# Patient Record
Sex: MALE | Race: WHITE | NOT HISPANIC OR LATINO | Employment: FULL TIME | URBAN - METROPOLITAN AREA
[De-identification: names, ages, dates, MRNs, and addresses within clinical notes are randomized per-mention and may not be internally consistent; named-entity substitution may affect disease eponyms.]

---

## 2023-03-04 LAB
ALBUMIN (G/DL) IN SER/PLAS: 4.6 G/DL (ref 3.4–5)
ANION GAP IN SER/PLAS: 11 MMOL/L (ref 10–20)
APPEARANCE, URINE: CLEAR
BILIRUBIN, URINE: NEGATIVE
BLOOD, URINE: ABNORMAL
CALCIUM (MG/DL) IN SER/PLAS: 9.7 MG/DL (ref 8.6–10.3)
CARBON DIOXIDE, TOTAL (MMOL/L) IN SER/PLAS: 27 MMOL/L (ref 21–32)
CHLORIDE (MMOL/L) IN SER/PLAS: 105 MMOL/L (ref 98–107)
COLOR, URINE: YELLOW
CORTISOL (UG/DL) IN SERUM: 0.5 UG/DL (ref 2.5–20)
CREATININE (MG/DL) IN SER/PLAS: 1.01 MG/DL (ref 0.5–1.3)
DEHYDROEPIANDROSTERONE SULFATE (DHEA-S) (UG/DL) IN SER/: <5 UG/DL (ref 95–530)
FOLLITROPIN (IU/L) IN SER/PLAS: 1.8 IU/L
GFR MALE: >90 ML/MIN/1.73M2
GLUCOSE (MG/DL) IN SER/PLAS: 94 MG/DL (ref 74–99)
GLUCOSE, URINE: NEGATIVE MG/DL
KETONES, URINE: NEGATIVE MG/DL
LEUKOCYTE ESTERASE, URINE: NEGATIVE
LUTEINIZING HORMONE (IU/ML) IN SER/PLAS: 0.2 IU/L
MUCUS, URINE: NORMAL /LPF
NITRITE, URINE: NEGATIVE
OSMOLALITY, RANDOM URINE: 646 MOSM/KG (ref 200–1200)
PH, URINE: 5 (ref 5–8)
PHOSPHATE (MG/DL) IN SER/PLAS: 4.5 MG/DL (ref 2.5–4.9)
POTASSIUM (MMOL/L) IN SER/PLAS: 4.1 MMOL/L (ref 3.5–5.3)
PROLACTIN (UG/L) IN SER/PLAS: 7.8 UG/L (ref 2–18)
PROTEIN, URINE: NEGATIVE MG/DL
RBC, URINE: <1 /HPF (ref 0–5)
SODIUM (MMOL/L) IN SER/PLAS: 139 MMOL/L (ref 136–145)
SPECIFIC GRAVITY, URINE: 1.01 (ref 1–1.03)
THYROTROPIN (MIU/L) IN SER/PLAS BY DETECTION LIMIT <= 0.05 MIU/L: 2.79 MIU/L (ref 0.44–3.98)
THYROXINE (T4) FREE (NG/DL) IN SER/PLAS: 0.31 NG/DL (ref 0.61–1.12)
UREA NITROGEN (MG/DL) IN SER/PLAS: 16 MG/DL (ref 6–23)
UROBILINOGEN, URINE: <2 MG/DL (ref 0–1.9)
WBC, URINE: <1 /HPF (ref 0–5)

## 2023-03-07 LAB — ADRENOCORTICOTROPIC HORMONE: 16.3 PG/ML (ref 7.2–63.3)

## 2023-03-08 LAB
GROWTH HORMONE: <0.05 NG/ML (ref 0.05–3)
IGF 1 (INSULIN-LIKE GROWTH FACTOR 1): 58 NG/ML (ref 82–237)
IGF 1 Z SCORE CALCULATION: -3.7

## 2023-03-16 LAB
TESTOSTERONE FREE (CHAN): 3 PG/ML (ref 35–155)
TESTOSTERONE,TOTAL,LC-MS/MS: 8 NG/DL (ref 250–1100)

## 2023-03-24 LAB
ERYTHROCYTE DISTRIBUTION WIDTH (RATIO) BY AUTOMATED COUNT: 12.2 % (ref 11.5–14.5)
ERYTHROCYTE MEAN CORPUSCULAR HEMOGLOBIN CONCENTRATION (G/DL) BY AUTOMATED: 33.7 G/DL (ref 32–36)
ERYTHROCYTE MEAN CORPUSCULAR VOLUME (FL) BY AUTOMATED COUNT: 91 FL (ref 80–100)
ERYTHROCYTES (10*6/UL) IN BLOOD BY AUTOMATED COUNT: 3.96 X10E12/L (ref 4.5–5.9)
HEMATOCRIT (%) IN BLOOD BY AUTOMATED COUNT: 36.2 % (ref 41–52)
HEMOGLOBIN (G/DL) IN BLOOD: 12.2 G/DL (ref 13.5–17.5)
LEUKOCYTES (10*3/UL) IN BLOOD BY AUTOMATED COUNT: 5.6 X10E9/L (ref 4.4–11.3)
NRBC (PER 100 WBCS) BY AUTOMATED COUNT: 0 /100 WBC (ref 0–0)
PLATELETS (10*3/UL) IN BLOOD AUTOMATED COUNT: 251 X10E9/L (ref 150–450)

## 2023-03-25 LAB
ALANINE AMINOTRANSFERASE (SGPT) (U/L) IN SER/PLAS: 20 U/L (ref 10–52)
ALBUMIN (G/DL) IN SER/PLAS: 4.7 G/DL (ref 3.4–5)
ALKALINE PHOSPHATASE (U/L) IN SER/PLAS: 57 U/L (ref 33–120)
ANION GAP IN SER/PLAS: 13 MMOL/L (ref 10–20)
ASPARTATE AMINOTRANSFERASE (SGOT) (U/L) IN SER/PLAS: 20 U/L (ref 9–39)
BILIRUBIN TOTAL (MG/DL) IN SER/PLAS: 0.6 MG/DL (ref 0–1.2)
CALCIUM (MG/DL) IN SER/PLAS: 10.3 MG/DL (ref 8.6–10.6)
CARBON DIOXIDE, TOTAL (MMOL/L) IN SER/PLAS: 31 MMOL/L (ref 21–32)
CHLORIDE (MMOL/L) IN SER/PLAS: 104 MMOL/L (ref 98–107)
CREATININE (MG/DL) IN SER/PLAS: 1.01 MG/DL (ref 0.5–1.3)
GFR MALE: >90 ML/MIN/1.73M2
GLUCOSE (MG/DL) IN SER/PLAS: 83 MG/DL (ref 74–99)
POTASSIUM (MMOL/L) IN SER/PLAS: 4.4 MMOL/L (ref 3.5–5.3)
PROSTATE SPECIFIC ANTIGEN,SCREEN: <0.1 NG/ML (ref 0–4)
PROTEIN TOTAL: 7.1 G/DL (ref 6.4–8.2)
SODIUM (MMOL/L) IN SER/PLAS: 144 MMOL/L (ref 136–145)
THYROXINE (T4) FREE (NG/DL) IN SER/PLAS: 1.05 NG/DL (ref 0.78–1.48)
UREA NITROGEN (MG/DL) IN SER/PLAS: 19 MG/DL (ref 6–23)

## 2023-08-26 LAB
ALANINE AMINOTRANSFERASE (SGPT) (U/L) IN SER/PLAS: 19 U/L (ref 10–52)
ALBUMIN (G/DL) IN SER/PLAS: 4.7 G/DL (ref 3.4–5)
ALKALINE PHOSPHATASE (U/L) IN SER/PLAS: 51 U/L (ref 33–120)
ANION GAP IN SER/PLAS: 11 MMOL/L (ref 10–20)
ASPARTATE AMINOTRANSFERASE (SGOT) (U/L) IN SER/PLAS: 21 U/L (ref 9–39)
BILIRUBIN TOTAL (MG/DL) IN SER/PLAS: 0.6 MG/DL (ref 0–1.2)
CALCIUM (MG/DL) IN SER/PLAS: 9.2 MG/DL (ref 8.6–10.3)
CARBON DIOXIDE, TOTAL (MMOL/L) IN SER/PLAS: 28 MMOL/L (ref 21–32)
CHLORIDE (MMOL/L) IN SER/PLAS: 105 MMOL/L (ref 98–107)
CREATININE (MG/DL) IN SER/PLAS: 1.02 MG/DL (ref 0.5–1.3)
ERYTHROCYTE DISTRIBUTION WIDTH (RATIO) BY AUTOMATED COUNT: 13 % (ref 11.5–14.5)
ERYTHROCYTE MEAN CORPUSCULAR HEMOGLOBIN CONCENTRATION (G/DL) BY AUTOMATED: 33.5 G/DL (ref 32–36)
ERYTHROCYTE MEAN CORPUSCULAR VOLUME (FL) BY AUTOMATED COUNT: 89 FL (ref 80–100)
ERYTHROCYTES (10*6/UL) IN BLOOD BY AUTOMATED COUNT: 4.72 X10E12/L (ref 4.5–5.9)
GFR MALE: >90 ML/MIN/1.73M2
GLUCOSE (MG/DL) IN SER/PLAS: 94 MG/DL (ref 74–99)
HEMATOCRIT (%) IN BLOOD BY AUTOMATED COUNT: 42.1 % (ref 41–52)
HEMOGLOBIN (G/DL) IN BLOOD: 14.1 G/DL (ref 13.5–17.5)
LEUKOCYTES (10*3/UL) IN BLOOD BY AUTOMATED COUNT: 6.4 X10E9/L (ref 4.4–11.3)
PLATELETS (10*3/UL) IN BLOOD AUTOMATED COUNT: 240 X10E9/L (ref 150–450)
POTASSIUM (MMOL/L) IN SER/PLAS: 4.4 MMOL/L (ref 3.5–5.3)
PROLACTIN (UG/L) IN SER/PLAS: 8 UG/L (ref 2–18)
PROSTATE SPECIFIC ANTIGEN,SCREEN: 0.19 NG/ML (ref 0–4)
PROTEIN TOTAL: 6.7 G/DL (ref 6.4–8.2)
SODIUM (MMOL/L) IN SER/PLAS: 140 MMOL/L (ref 136–145)
THYROXINE (T4) FREE (NG/DL) IN SER/PLAS: 0.62 NG/DL (ref 0.61–1.12)
UREA NITROGEN (MG/DL) IN SER/PLAS: 17 MG/DL (ref 6–23)

## 2023-08-31 LAB
TESTOSTERONE FREE (CHAN): 79.4 PG/ML (ref 35–155)
TESTOSTERONE,TOTAL,LC-MS/MS: 446 NG/DL (ref 250–1100)

## 2023-11-04 DIAGNOSIS — E23.0 HYPOPITUITARISM (MULTI): ICD-10-CM

## 2023-11-04 DIAGNOSIS — E29.1 HYPOGONADISM MALE: Primary | ICD-10-CM

## 2023-11-06 RX ORDER — TESTOSTERONE CYPIONATE 200 MG/ML
INJECTION, SOLUTION INTRAMUSCULAR
Qty: 1 ML | Refills: 3 | Status: SHIPPED | OUTPATIENT
Start: 2023-11-06 | End: 2024-03-25 | Stop reason: SDUPTHER

## 2023-12-08 DIAGNOSIS — E23.6 RATHKE'S CLEFT CYST (MULTI): Primary | ICD-10-CM

## 2024-02-12 ENCOUNTER — HOSPITAL ENCOUNTER (OUTPATIENT)
Dept: RADIOLOGY | Facility: HOSPITAL | Age: 39
Discharge: HOME | End: 2024-02-12
Payer: COMMERCIAL

## 2024-02-12 DIAGNOSIS — E23.6 RATHKE'S CLEFT CYST (MULTI): ICD-10-CM

## 2024-02-12 PROCEDURE — 70553 MRI BRAIN STEM W/O & W/DYE: CPT

## 2024-02-12 PROCEDURE — A9575 INJ GADOTERATE MEGLUMI 0.1ML: HCPCS | Performed by: NURSE PRACTITIONER

## 2024-02-12 PROCEDURE — 70553 MRI BRAIN STEM W/O & W/DYE: CPT | Performed by: RADIOLOGY

## 2024-02-12 PROCEDURE — 2550000001 HC RX 255 CONTRASTS: Performed by: NURSE PRACTITIONER

## 2024-02-12 RX ORDER — GADOTERATE MEGLUMINE 376.9 MG/ML
0.2 INJECTION INTRAVENOUS
Status: COMPLETED | OUTPATIENT
Start: 2024-02-12 | End: 2024-02-12

## 2024-02-12 RX ADMIN — GADOTERATE MEGLUMINE 20 ML: 376.9 INJECTION INTRAVENOUS at 10:29

## 2024-02-23 ENCOUNTER — APPOINTMENT (OUTPATIENT)
Dept: ENDOCRINOLOGY | Facility: CLINIC | Age: 39
End: 2024-02-23
Payer: COMMERCIAL

## 2024-02-28 ENCOUNTER — TELEPHONE (OUTPATIENT)
Dept: ENDOCRINOLOGY | Facility: HOSPITAL | Age: 39
End: 2024-02-28
Payer: COMMERCIAL

## 2024-02-28 DIAGNOSIS — D49.7 HYPOPITUITARISM DUE TO PITUITARY TUMOR (MULTI): Primary | ICD-10-CM

## 2024-02-28 DIAGNOSIS — E23.0 HYPOPITUITARISM DUE TO PITUITARY TUMOR (MULTI): Primary | ICD-10-CM

## 2024-02-28 NOTE — TELEPHONE ENCOUNTER
----- Message from Maru Van MD sent at 2/28/2024 10:50 AM EST -----  Regarding: RE: REfill Testosterone  He just last min cancelled his follow-up appt last week. Due for labs and needs to reschedule to be seen sooner than next Aug...  I will place orders, please let him know.  Pam - can please add him as a virtual appt on Mon 3/25 , anytime after 9a? TY  ----- Message -----  From: Sydney Chisholm RN  Sent: 2/27/2024   5:51 PM EST  To: Maru Van MD  Subject: REfill Testosterone                              Hi Maru  Patient needs refill on Testosterone, please send to local pharmacy.  Thanks  Sydney

## 2024-03-09 ENCOUNTER — LAB (OUTPATIENT)
Dept: LAB | Facility: LAB | Age: 39
End: 2024-03-09
Payer: COMMERCIAL

## 2024-03-09 DIAGNOSIS — E23.0 HYPOPITUITARISM DUE TO PITUITARY TUMOR (MULTI): ICD-10-CM

## 2024-03-09 DIAGNOSIS — D49.7 HYPOPITUITARISM DUE TO PITUITARY TUMOR (MULTI): ICD-10-CM

## 2024-03-09 LAB
ALBUMIN SERPL BCP-MCNC: 4.3 G/DL (ref 3.4–5)
ALP SERPL-CCNC: 46 U/L (ref 33–120)
ALT SERPL W P-5'-P-CCNC: 19 U/L (ref 10–52)
ANION GAP SERPL CALC-SCNC: 15 MMOL/L (ref 10–20)
AST SERPL W P-5'-P-CCNC: 19 U/L (ref 9–39)
BILIRUB SERPL-MCNC: 0.8 MG/DL (ref 0–1.2)
BUN SERPL-MCNC: 14 MG/DL (ref 6–23)
CALCIUM SERPL-MCNC: 8.9 MG/DL (ref 8.6–10.3)
CHLORIDE SERPL-SCNC: 106 MMOL/L (ref 98–107)
CO2 SERPL-SCNC: 24 MMOL/L (ref 21–32)
CREAT SERPL-MCNC: 0.81 MG/DL (ref 0.5–1.3)
EGFRCR SERPLBLD CKD-EPI 2021: >90 ML/MIN/1.73M*2
ERYTHROCYTE [DISTWIDTH] IN BLOOD BY AUTOMATED COUNT: 12.8 % (ref 11.5–14.5)
GLUCOSE SERPL-MCNC: 97 MG/DL (ref 74–99)
HCT VFR BLD AUTO: 42.2 % (ref 41–52)
HGB BLD-MCNC: 14.6 G/DL (ref 13.5–17.5)
MCH RBC QN AUTO: 30.1 PG (ref 26–34)
MCHC RBC AUTO-ENTMCNC: 34.6 G/DL (ref 32–36)
MCV RBC AUTO: 87 FL (ref 80–100)
NRBC BLD-RTO: 0 /100 WBCS (ref 0–0)
PLATELET # BLD AUTO: 234 X10*3/UL (ref 150–450)
POTASSIUM SERPL-SCNC: 4 MMOL/L (ref 3.5–5.3)
PROLACTIN SERPL-MCNC: 9.6 UG/L (ref 2–18)
PROT SERPL-MCNC: 6.4 G/DL (ref 6.4–8.2)
PSA SERPL-MCNC: 0.66 NG/ML
RBC # BLD AUTO: 4.85 X10*6/UL (ref 4.5–5.9)
SODIUM SERPL-SCNC: 141 MMOL/L (ref 136–145)
T4 FREE SERPL-MCNC: 0.82 NG/DL (ref 0.61–1.12)
WBC # BLD AUTO: 4 X10*3/UL (ref 4.4–11.3)

## 2024-03-09 PROCEDURE — 84146 ASSAY OF PROLACTIN: CPT

## 2024-03-09 PROCEDURE — 36415 COLL VENOUS BLD VENIPUNCTURE: CPT

## 2024-03-09 PROCEDURE — 84402 ASSAY OF FREE TESTOSTERONE: CPT

## 2024-03-09 PROCEDURE — 84153 ASSAY OF PSA TOTAL: CPT

## 2024-03-15 LAB
TESTOSTERONE FREE (CHAN): 77.3 PG/ML (ref 35–155)
TESTOSTERONE,TOTAL,LC-MS/MS: 452 NG/DL (ref 250–1100)

## 2024-03-17 DIAGNOSIS — E23.0 HYPOPITUITARISM DUE TO PITUITARY TUMOR (MULTI): Primary | ICD-10-CM

## 2024-03-17 DIAGNOSIS — D49.7 HYPOPITUITARISM DUE TO PITUITARY TUMOR (MULTI): Primary | ICD-10-CM

## 2024-03-18 RX ORDER — LEVOTHYROXINE SODIUM 137 UG/1
TABLET ORAL
Qty: 30 TABLET | Refills: 0 | Status: SHIPPED | OUTPATIENT
Start: 2024-03-18 | End: 2024-03-25 | Stop reason: DRUGHIGH

## 2024-03-25 ENCOUNTER — TELEMEDICINE (OUTPATIENT)
Dept: ENDOCRINOLOGY | Facility: CLINIC | Age: 39
End: 2024-03-25
Payer: COMMERCIAL

## 2024-03-25 DIAGNOSIS — E29.1 HYPOGONADISM MALE: ICD-10-CM

## 2024-03-25 DIAGNOSIS — E23.0 HYPOPITUITARISM DUE TO PITUITARY TUMOR (MULTI): Primary | ICD-10-CM

## 2024-03-25 DIAGNOSIS — D49.7 HYPOPITUITARISM DUE TO PITUITARY TUMOR (MULTI): Primary | ICD-10-CM

## 2024-03-25 DIAGNOSIS — E27.49 SECONDARY ADRENAL INSUFFICIENCY (MULTI): ICD-10-CM

## 2024-03-25 DIAGNOSIS — E23.7: ICD-10-CM

## 2024-03-25 DIAGNOSIS — E23.0 HYPOGONADOTROPIC HYPOGONADISM IN MALE (MULTI): ICD-10-CM

## 2024-03-25 DIAGNOSIS — E03.8: ICD-10-CM

## 2024-03-25 DIAGNOSIS — E23.0 HYPOPITUITARISM (MULTI): ICD-10-CM

## 2024-03-25 PROCEDURE — 99214 OFFICE O/P EST MOD 30 MIN: CPT | Performed by: INTERNAL MEDICINE

## 2024-03-25 RX ORDER — TESTOSTERONE CYPIONATE 200 MG/ML
INJECTION, SOLUTION INTRAMUSCULAR
Qty: 1 ML | Refills: 3 | Status: SHIPPED | OUTPATIENT
Start: 2024-03-25

## 2024-03-25 RX ORDER — LEVOTHYROXINE SODIUM 150 UG/1
150 TABLET ORAL
Qty: 90 TABLET | Refills: 3 | Status: SHIPPED | OUTPATIENT
Start: 2024-03-25 | End: 2025-03-25

## 2024-03-25 RX ORDER — HYDROCORTISONE 5 MG/1
TABLET ORAL
COMMUNITY
End: 2024-04-01

## 2024-03-25 NOTE — PROGRESS NOTES
An interactive audio and video telecommunication system which permits real time communications between the patient (at the originating site) and provider (at the distant site) was utilized to provide this telehealth service.   Verbal consent was requested and obtained from Adolph Delgado on this date, 03/25/24 for a telehealth visit.     Subjective   Adolph Delgado is a 39 y.o. male who presents to endocrinology clinic for follow-up of hypopituitarism in the setting of an incidentally found pituitary mass   Last visit: 8/18/23    ENDOCRINE Hx:   He went to the ED 12/5/22 for altered mentation and ultimately diagnosed with influenza / viral meningitis. As part of his evaluation, he had an MRI brain which showed a mass in the sellar region. MRI sella w/wo contrast from 12/7/22: 1.6x1.6x2cm (my measurements) non-enhancing slightly heterogenous sellar mass abutting optic chiasm and the cavernous sinuses bilaterally. Labs also sent on 12/7/22 (during hospitalization): a low FT4 with inappropriately normal TSH, undetectable ACTH, and low free cortisol. In late Feb 2023, saw neuro-ophtho - no significant visual field defects.    Seen for initial endocrine eval in early March. 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 He was started on LT4 and HC replacement, followed by T replacement. Referred to sleep med for c/f GENTRY; saw in May 2023, recommended PSG.    MRI sella repeated in June 2023: stable to slight decrease in size.  States NSGY cancelled his follow-up appointment.    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    Objective   Reports weight is up to 230 lb    Physical Exam  alert and conversant, in no acute distress  no lid lag, no proptosis  no visible goiter  normal work of breathing  some facial hair  No visible rashes  Normal mood /affect     Lab Results   Component Value Date    FREET4 0.82 03/09/2024    PROLACTIN 9.6 03/09/2024    ALT 19 03/09/2024    AST 19 03/09/2024    TESTOTOTMS 452 03/09/2024    PSASCREEN 0.66 03/09/2024    HGB 14.6 03/09/2024    HCT 42.2 03/09/2024     MRI Sella 2/12/24:   There is again evidence of a sellar/suprasellar nonenhancing lesion demonstrating bright signal on the T2 images and intermediate signal on the T1 weighted images without significant interval change in size and configuration when compared with the prior MRI dated 06/02/2023 as described above. Again, possible considerations could include a cystic pituitary macroadenoma versus Rathke's cleft cyst.    Assessment/Plan   39 y.o. 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  Clinically well replaced with  HC and T   Some weight gain - FT4 on lower side - will uptitrate LT4 dose     PLAN  - Continue HC 10-5-5mg  - continue T 100mg q2 weeks; due to complete T contract, reviewed & will send via [employer]  - increase LT4 to 150mcg daily (or 137mcg 8 tabs/wk)  - overdue for follow-up with ophtho (scheduled in July), neurosurgery (will message)  - recommended scheduling his PSG  - RTC 6 mo with labs prior:  -     Insulin-Like Growth Factor 1; Future  -     Prolactin; Future  -     Comprehensive Metabolic Panel; Future  -     CBC; Future  -     Testosterone,Total, LC-MS/MS; Future  -     Thyroxine, Free; Future

## 2024-03-25 NOTE — PATIENT INSTRUCTIONS
Labs will be due again in 6 months  Please follow-up with neurosurgery and ophthalmology  Will send you a copy of testosterone contract to complete  Follow-up in 6 months

## 2024-04-01 DIAGNOSIS — E27.49 SECONDARY ADRENAL INSUFFICIENCY (MULTI): Primary | ICD-10-CM

## 2024-04-01 RX ORDER — HYDROCORTISONE 5 MG/1
TABLET ORAL
Qty: 360 TABLET | Refills: 1 | Status: SHIPPED | OUTPATIENT
Start: 2024-04-01

## 2024-04-18 ENCOUNTER — TELEMEDICINE (OUTPATIENT)
Dept: NEUROSURGERY | Facility: CLINIC | Age: 39
End: 2024-04-18
Payer: COMMERCIAL

## 2024-04-18 DIAGNOSIS — E23.6 RATHKE'S CLEFT CYST (MULTI): Primary | ICD-10-CM

## 2024-04-18 PROCEDURE — 99212 OFFICE O/P EST SF 10 MIN: CPT | Mod: 95 | Performed by: NURSE PRACTITIONER

## 2024-04-18 PROCEDURE — 99212 OFFICE O/P EST SF 10 MIN: CPT | Performed by: NURSE PRACTITIONER

## 2024-04-18 NOTE — PROGRESS NOTES
[facility]  Neurosurgery    History of Present Illness                        Objective      Vitals:   There were no vitals taken for this visit.        Physical Exam:            Relevant Results:            Assessment & Plan      Diagnosis:  {There are no diagnoses linked to this encounter. (Refresh or delete this SmartLink)}        Provider Impression:         Medical History     No past medical history on file.  Past Surgical History:   Procedure Laterality Date    OTHER SURGICAL HISTORY  12/13/2022    Anterior cruciate ligament repair    OTHER SURGICAL HISTORY  12/13/2022    Hernia repair    OTHER SURGICAL HISTORY  12/13/2022    Finger amputation        No family history on file.  No Known Allergies  Current Outpatient Medications   Medication Instructions    hydrocortisone (Cortef) 5 mg tablet TAKE BY MOUTH 2 TABS (10MG) IN EARLY MORNING, 1 TAB (5MG) AT MIDDAY, AND 1 TAB (5MG) IN THE LATE AFTERNOON EVERY DAY. TAKE TRIPLE (3X) DOSE IN CASE OF FEVER OR SEVERE ILLNESS.    levothyroxine (SYNTHROID, LEVOXYL) 150 mcg, oral, Daily before breakfast    testosterone cypionate (Depo-Testosterone) 200 mg/mL injection INJECT 100MG (0.5ML) EVERY 2 WEEKS INTRAMUSCULARLY       Denies any vision changes and is following with ophthalmology without any visual felid deficits. Denies any headaches, dizziness, gait instability or recent falls. He is following with endocrinology and taking levothyroxine, hydrocortisone, and testosterone.

## 2024-07-05 ENCOUNTER — APPOINTMENT (OUTPATIENT)
Dept: OPHTHALMOLOGY | Facility: CLINIC | Age: 39
End: 2024-07-05
Payer: COMMERCIAL

## 2024-08-02 ENCOUNTER — APPOINTMENT (OUTPATIENT)
Dept: ENDOCRINOLOGY | Facility: CLINIC | Age: 39
End: 2024-08-02
Payer: COMMERCIAL

## 2024-08-02 DIAGNOSIS — E23.7: ICD-10-CM

## 2024-08-02 DIAGNOSIS — E27.49 SECONDARY ADRENAL INSUFFICIENCY (MULTI): ICD-10-CM

## 2024-08-02 DIAGNOSIS — E23.0 HYPOPITUITARISM (MULTI): Primary | ICD-10-CM

## 2024-08-02 DIAGNOSIS — E23.0 HYPOGONADOTROPIC HYPOGONADISM IN MALE (MULTI): ICD-10-CM

## 2024-08-02 DIAGNOSIS — E03.8: ICD-10-CM

## 2024-08-02 PROCEDURE — 99214 OFFICE O/P EST MOD 30 MIN: CPT | Performed by: INTERNAL MEDICINE

## 2024-08-02 NOTE — PROGRESS NOTES
Subjective   Adolph Delgado is a 39 y.o. male who presents to endocrinology clinic for follow-up   Last visit: 3/25/24 (virtual); seen 8/18/23 in person    ENDOCRINE Hx:   Went to the ED 12/5/22 for altered mentation and ultimately diagnosed with influenza / viral meningitis. As part of his evaluation, he had an MRI brain which showed a mass in the sellar region. MRI sella w/wo contrast from 12/7/22: 1.6x1.6x2cm (my measurements) non-enhancing slightly heterogenous sellar mass abutting optic chiasm and the cavernous sinuses bilaterally. Labs sent on 12/7/22 (during hospitalization): low FT4 with inappropriately normal TSH, undetectable ACTH, and low free cortisol. In late Feb 2023, saw neuro-ophtho - no significant visual field defects.      Seen for initial endocrine eval in early March. 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 He was started on LT4 and HC replacement, followed by T replacement. Referred to sleep med for c/f GENTRY; saw in May 2023, recommended PSG.     MRI sella repeated in June 2023: stable to slight decrease in size  MRI sella repeated Feb 2024: stable     INTERVAL Hx:   Had FUV with NSGY virtually in April - felt likely Rathke's cyst kaela given stability  Hasn't had labs drawn yet    Was out of meds for a week (checked a bag with all his meds in it during the IT outage!)  Felt terrible during that time - nauseated, exhausted  Back on meds now, feeling a LOT better    Otherwise, no interval illnesses  Still hasn't gotten PSG - too busy  Last saw ophtho Feb 2023 - scheduled to see them 9/30    CURRENT REGIMEN:  Testosterone 100mg IM q2 weeks on Sundays - now administering himself, no issues  Levothyroxine 150 mcg - taking daily in the AM  Hydrocortisone - taking 2 tabs in morning, 1 tab midday, 1 tab around 5pm    adherence: good     SOCIAL HISTORY:  Lives with  wife and kids 3 school aged kids  Works in railroad construction, travels a lot  Will be home next week for 2 weeks      ROS:   Energy: good  Headaches: no  Vision: normal   Appetite: good  Sleep: good  N/V: rare  Body/facial hair: more  Hot flashes/ night sweats: no  Bowel habits: regular  LUTS: no  Galactorrhea: no  Urination: normal  Thirst: normal  Otherwise negative.    Objective     Physical Exam  alert and conversant, in no acute distress  no lid lag, no proptosis  no visible goiter  normal work of breathing  + facial hair  No visible rashes  Normal mood /affect     Lab Results   Component Value Date    TSH 2.79 03/04/2023    FREET4 0.82 03/09/2024    PROLACTIN 9.6 03/09/2024    [id number] 58 (L) 03/04/2023    LDLF 64 12/07/2022    TRIG 336 (H) 12/07/2022    ALT 19 03/09/2024    AST 19 03/09/2024    TESTOTOTMS 452 03/09/2024         Assessment/Plan   39 y.o. 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  Clinically well replaced with HC and T & LT4     PLAN  - Continue HC 10-5-5mg  - continue T 100mg q2 weeks  - increase LT4 to 150mcg daily  - will follow-up with ophtho next month  - recommended scheduling his PSG  - due for repeat labs, not yet drawn:  -     Insulin-Like Growth Factor 1; Future  -     Prolactin; Future  -     Comprehensive Metabolic Panel; Future  -     CBC; Future  -     Testosterone,Total, LC-MS/MS; Future  -     Thyroxine, Free; Future  FUV in 6 mo (in person)

## 2024-08-12 ENCOUNTER — TELEPHONE (OUTPATIENT)
Dept: PRIMARY CARE | Facility: CLINIC | Age: 39
End: 2024-08-12
Payer: COMMERCIAL

## 2024-08-12 ENCOUNTER — OFFICE VISIT (OUTPATIENT)
Dept: PRIMARY CARE | Facility: CLINIC | Age: 39
End: 2024-08-12
Payer: COMMERCIAL

## 2024-08-12 ENCOUNTER — LAB (OUTPATIENT)
Dept: LAB | Facility: LAB | Age: 39
End: 2024-08-12
Payer: COMMERCIAL

## 2024-08-12 ENCOUNTER — HOSPITAL ENCOUNTER (OUTPATIENT)
Dept: RADIOLOGY | Facility: HOSPITAL | Age: 39
Discharge: HOME | End: 2024-08-12
Payer: COMMERCIAL

## 2024-08-12 VITALS
BODY MASS INDEX: 31.96 KG/M2 | OXYGEN SATURATION: 96 % | HEART RATE: 74 BPM | SYSTOLIC BLOOD PRESSURE: 107 MMHG | TEMPERATURE: 97.2 F | DIASTOLIC BLOOD PRESSURE: 66 MMHG | WEIGHT: 215.8 LBS

## 2024-08-12 DIAGNOSIS — J18.9 PNEUMONIA OF RIGHT MIDDLE LOBE DUE TO INFECTIOUS ORGANISM: Primary | ICD-10-CM

## 2024-08-12 DIAGNOSIS — J40 BRONCHITIS: ICD-10-CM

## 2024-08-12 DIAGNOSIS — R05.1 ACUTE COUGH: ICD-10-CM

## 2024-08-12 DIAGNOSIS — R50.9 FEVER, UNSPECIFIED FEVER CAUSE: ICD-10-CM

## 2024-08-12 DIAGNOSIS — E66.9 CLASS 1 OBESITY WITH SERIOUS COMORBIDITY AND BODY MASS INDEX (BMI) OF 31.0 TO 31.9 IN ADULT, UNSPECIFIED OBESITY TYPE: ICD-10-CM

## 2024-08-12 LAB
ANION GAP SERPL CALC-SCNC: 9 MMOL/L (ref 10–20)
BASOPHILS # BLD AUTO: 0.02 X10*3/UL (ref 0–0.1)
BASOPHILS NFR BLD AUTO: 0.3 %
BUN SERPL-MCNC: 15 MG/DL (ref 6–23)
CALCIUM SERPL-MCNC: 9.1 MG/DL (ref 8.6–10.3)
CHLORIDE SERPL-SCNC: 101 MMOL/L (ref 98–107)
CO2 SERPL-SCNC: 31 MMOL/L (ref 21–32)
CREAT SERPL-MCNC: 1.01 MG/DL (ref 0.5–1.3)
EGFRCR SERPLBLD CKD-EPI 2021: >90 ML/MIN/1.73M*2
EOSINOPHIL # BLD AUTO: 0.21 X10*3/UL (ref 0–0.7)
EOSINOPHIL NFR BLD AUTO: 3.6 %
ERYTHROCYTE [DISTWIDTH] IN BLOOD BY AUTOMATED COUNT: 12.2 % (ref 11.5–14.5)
GLUCOSE SERPL-MCNC: 113 MG/DL (ref 74–99)
HCT VFR BLD AUTO: 41 % (ref 41–52)
HGB BLD-MCNC: 14 G/DL (ref 13.5–17.5)
IMM GRANULOCYTES # BLD AUTO: 0.01 X10*3/UL (ref 0–0.7)
IMM GRANULOCYTES NFR BLD AUTO: 0.2 % (ref 0–0.9)
LYMPHOCYTES # BLD AUTO: 1.15 X10*3/UL (ref 1.2–4.8)
LYMPHOCYTES NFR BLD AUTO: 19.8 %
MCH RBC QN AUTO: 29.9 PG (ref 26–34)
MCHC RBC AUTO-ENTMCNC: 34.1 G/DL (ref 32–36)
MCV RBC AUTO: 88 FL (ref 80–100)
MONOCYTES # BLD AUTO: 0.58 X10*3/UL (ref 0.1–1)
MONOCYTES NFR BLD AUTO: 10 %
NEUTROPHILS # BLD AUTO: 3.84 X10*3/UL (ref 1.2–7.7)
NEUTROPHILS NFR BLD AUTO: 66.1 %
NRBC BLD-RTO: 0 /100 WBCS (ref 0–0)
PLATELET # BLD AUTO: 200 X10*3/UL (ref 150–450)
POTASSIUM SERPL-SCNC: 3.9 MMOL/L (ref 3.5–5.3)
RBC # BLD AUTO: 4.68 X10*6/UL (ref 4.5–5.9)
SODIUM SERPL-SCNC: 137 MMOL/L (ref 136–145)
WBC # BLD AUTO: 5.8 X10*3/UL (ref 4.4–11.3)

## 2024-08-12 PROCEDURE — 99214 OFFICE O/P EST MOD 30 MIN: CPT | Performed by: NURSE PRACTITIONER

## 2024-08-12 PROCEDURE — 85025 COMPLETE CBC W/AUTO DIFF WBC: CPT

## 2024-08-12 PROCEDURE — 80048 BASIC METABOLIC PNL TOTAL CA: CPT

## 2024-08-12 PROCEDURE — 71046 X-RAY EXAM CHEST 2 VIEWS: CPT

## 2024-08-12 PROCEDURE — 71046 X-RAY EXAM CHEST 2 VIEWS: CPT | Performed by: RADIOLOGY

## 2024-08-12 PROCEDURE — 36415 COLL VENOUS BLD VENIPUNCTURE: CPT

## 2024-08-12 RX ORDER — SYRINGE WITH NEEDLE, 1 ML 25GX5/8"
SYRINGE, EMPTY DISPOSABLE MISCELLANEOUS
COMMUNITY
Start: 2024-06-28

## 2024-08-12 RX ORDER — METHYLPREDNISOLONE 4 MG/1
TABLET ORAL
Qty: 1 EACH | Refills: 0 | Status: SHIPPED | OUTPATIENT
Start: 2024-08-12

## 2024-08-12 RX ORDER — DOXYCYCLINE 100 MG/1
100 TABLET ORAL 2 TIMES DAILY
Qty: 20 TABLET | Refills: 0 | Status: SHIPPED | OUTPATIENT
Start: 2024-08-12 | End: 2024-08-22

## 2024-08-12 NOTE — PROGRESS NOTES
Subjective   Patient ID: Adolph Delgado is a 39 y.o. male who presents for Diarrhea, Cough, Fever, Generalized Body Aches (X 3-4 days), and Nausea.    C/o 3-4 days of cough, fever, general body aches, diarrhea, nausea.  GI symptoms improving.  Afebrile.  On exam, wheezing.  Chest x-ray shows right middle lobe infiltrate.  CBC, no leukocytosis.  Otherwise labs unremarkable.  Start doxycycline 100 mg twice a day for 10 days for pneumonia.  Start Medrol Dosepak for acute bronchitis.  Advised to stay well hydrated, rest. Instructed to call the office if symptoms worsen or do not improve. Tylenol 650 mg every 8 hours as needed for pain or fever. OTC Robitussin or Mucinex according to package directions as needed for cough.        The 10-year ASCVD risk score (Lili CARDENAS, et al., 2019) is: 1.6%    Values used to calculate the score:      Age: 39 years      Sex: Male      Is Non- : No      Diabetic: No      Tobacco smoker: No      Systolic Blood Pressure: 107 mmHg      Is BP treated: No      HDL Cholesterol: 24 mg/dL      Total Cholesterol: 155 mg/dL    Lab on 08/12/2024   Component Date Value Ref Range Status    Glucose 08/12/2024 113 (H)  74 - 99 mg/dL Final    Sodium 08/12/2024 137  136 - 145 mmol/L Final    Potassium 08/12/2024 3.9  3.5 - 5.3 mmol/L Final    Chloride 08/12/2024 101  98 - 107 mmol/L Final    Bicarbonate 08/12/2024 31  21 - 32 mmol/L Final    Anion Gap 08/12/2024 9 (L)  10 - 20 mmol/L Final    Urea Nitrogen 08/12/2024 15  6 - 23 mg/dL Final    Creatinine 08/12/2024 1.01  0.50 - 1.30 mg/dL Final    eGFR 08/12/2024 >90  >60 mL/min/1.73m*2 Final    Calculations of estimated GFR are performed using the 2021 CKD-EPI Study Refit equation without the race variable for the IDMS-Traceable creatinine methods.  https://jasn.asnjournals.org/content/early/2021/09/22/ASN.1673667257    Calcium 08/12/2024 9.1  8.6 - 10.3 mg/dL Final    WBC 08/12/2024 5.8  4.4 - 11.3 x10*3/uL Final    nRBC 08/12/2024  0.0  0.0 - 0.0 /100 WBCs Final    RBC 08/12/2024 4.68  4.50 - 5.90 x10*6/uL Final    Hemoglobin 08/12/2024 14.0  13.5 - 17.5 g/dL Final    Hematocrit 08/12/2024 41.0  41.0 - 52.0 % Final    MCV 08/12/2024 88  80 - 100 fL Final    MCH 08/12/2024 29.9  26.0 - 34.0 pg Final    MCHC 08/12/2024 34.1  32.0 - 36.0 g/dL Final    RDW 08/12/2024 12.2  11.5 - 14.5 % Final    Platelets 08/12/2024 200  150 - 450 x10*3/uL Final    Neutrophils % 08/12/2024 66.1  40.0 - 80.0 % Final    Immature Granulocytes %, Automated 08/12/2024 0.2  0.0 - 0.9 % Final    Immature Granulocyte Count (IG) includes promyelocytes, myelocytes and metamyelocytes but does not include bands. Percent differential counts (%) should be interpreted in the context of the absolute cell counts (cells/UL).    Lymphocytes % 08/12/2024 19.8  13.0 - 44.0 % Final    Monocytes % 08/12/2024 10.0  2.0 - 10.0 % Final    Eosinophils % 08/12/2024 3.6  0.0 - 6.0 % Final    Basophils % 08/12/2024 0.3  0.0 - 2.0 % Final    Neutrophils Absolute 08/12/2024 3.84  1.20 - 7.70 x10*3/uL Final    Percent differential counts (%) should be interpreted in the context of the absolute cell counts (cells/uL).    Immature Granulocytes Absolute, Au* 08/12/2024 0.01  0.00 - 0.70 x10*3/uL Final    Lymphocytes Absolute 08/12/2024 1.15 (L)  1.20 - 4.80 x10*3/uL Final    Monocytes Absolute 08/12/2024 0.58  0.10 - 1.00 x10*3/uL Final    Eosinophils Absolute 08/12/2024 0.21  0.00 - 0.70 x10*3/uL Final    Basophils Absolute 08/12/2024 0.02  0.00 - 0.10 x10*3/uL Final       XR chest 2 views  Narrative: Interpreted By:  Pao Allen,   STUDY:  XR CHEST 2 VIEWS;  8/12/2024 1:07 pm      INDICATION:  Signs/Symptoms:cough.      COMPARISON:  None.      ACCESSION NUMBER(S):  [id number]      ORDERING CLINICIAN:  KIRSTY العراقي      FINDINGS:                  CARDIOMEDIASTINAL SILHOUETTE:  Cardiomediastinal silhouette is normal in size and configuration.      LUNGS:  Lungs are remarkable for a right  middle lobe infiltrate.      ABDOMEN:  No remarkable upper abdominal findings.      BONES:  No acute osseous changes.      Impression: Right middle lobe infiltrate. Consider pneumonia. Recommend follow-up  to document resolution.      MACRO:  None      Signed by: Pao Renatorachel 8/12/2024 2:34 PM  Dictation workstation:   [id number]       Review of Systems   Constitutional:  Positive for fatigue and fever. Negative for activity change, chills and unexpected weight change.   HENT:  Negative for congestion, ear pain and sore throat.    Eyes: Negative.    Respiratory:  Positive for cough, chest tightness and wheezing. Negative for shortness of breath.    Cardiovascular:  Negative for chest pain, palpitations and leg swelling.   Gastrointestinal:  Positive for diarrhea and nausea. Negative for abdominal pain, constipation and vomiting.   Endocrine: Negative.    Genitourinary: Negative.    Musculoskeletal: Negative.    Skin: Negative.    Allergic/Immunologic: Negative.    Neurological:  Negative for dizziness, speech difficulty, numbness and headaches.   Hematological: Negative.    Psychiatric/Behavioral: Negative.     All other systems reviewed and are negative.      Objective   Visit Vitals  /66   Pulse 74   Temp 36.2 °C (97.2 °F)   Wt 97.9 kg (215 lb 12.8 oz)   SpO2 96%   BMI 31.96 kg/m²   Smoking Status Never   BSA 2.18 m²      Physical Exam  Vitals and nursing note reviewed.   Constitutional:       General: He is not in acute distress.     Appearance: Normal appearance. He is obese. He is ill-appearing.   HENT:      Head: Normocephalic and atraumatic.      Right Ear: Tympanic membrane, ear canal and external ear normal.      Left Ear: Tympanic membrane, ear canal and external ear normal.      Nose: Nose normal.      Mouth/Throat:      Mouth: Mucous membranes are moist.      Pharynx: Oropharynx is clear.   Eyes:      Extraocular Movements: Extraocular movements intact.      Conjunctiva/sclera: Conjunctivae  normal.      Pupils: Pupils are equal, round, and reactive to light.   Cardiovascular:      Rate and Rhythm: Normal rate and regular rhythm.      Pulses: Normal pulses.      Heart sounds: Normal heart sounds. No murmur heard.  Pulmonary:      Effort: Pulmonary effort is normal. No respiratory distress.      Breath sounds: Wheezing present.      Comments: cough  Abdominal:      General: Bowel sounds are normal. There is no distension.      Palpations: Abdomen is soft.      Tenderness: There is no abdominal tenderness.   Musculoskeletal:         General: No tenderness. Normal range of motion.      Cervical back: Normal range of motion and neck supple.      Right lower leg: No edema.      Left lower leg: No edema.   Skin:     General: Skin is warm and dry.      Capillary Refill: Capillary refill takes less than 2 seconds.   Neurological:      General: No focal deficit present.      Mental Status: He is alert and oriented to person, place, and time.   Psychiatric:         Mood and Affect: Mood normal.         Behavior: Behavior normal.         Thought Content: Thought content normal.         Judgment: Judgment normal.         Assessment/Plan   Adolph was seen today for diarrhea, cough, fever, generalized body aches and nausea.  Diagnoses and all orders for this visit:  Pneumonia of right middle lobe due to infectious organism (Primary)  -     doxycycline (Adoxa) 100 mg tablet; Take 1 tablet (100 mg) by mouth 2 times a day for 10 days. Take with a full glass of water and do not lie down for at least 30 minutes after  Acute cough  -     Basic Metabolic Panel; Future  -     CBC and Auto Differential; Future  -     XR chest 2 views; Future  Fever, unspecified fever cause  -     Basic Metabolic Panel; Future  -     CBC and Auto Differential; Future  -     XR chest 2 views; Future  Bronchitis  -     methylPREDNISolone (Medrol Dospak) 4 mg tablets; Follow schedule on package instructions  Class 1 obesity with serious  comorbidity and body mass index (BMI) of 31.0 to 31.9 in adult, unspecified obesity type     # Pneumonia  -Start doxycycline 100 mg twice a day for 10 days  -Get plenty of rest  -Stay well-hydrated  -Call the office if symptoms worsen or do not improve  # Acute bronchitis  -Start Medrol Dosepak  -Over-the-counter Mucinex or Robitussin according to package directions as needed for cough  -Tylenol 650-1000 mg every 8 hours as needed for pain  -Drink plenty of fluids  -Get plenty of rest  -Call the office if symptoms worsen or do not improve    Follow-up as needed with me until 10/11/2024 and follow up after with new provider  Patient advised I will be leaving [facility], my last day is October 11, 2024. Discussed options for new provider.

## 2024-08-12 NOTE — TELEPHONE ENCOUNTER
Patient not seen in 2 years. Diarrhea, 102 fever, cough, not sleeping, achy everywhere since Saturday. Covid negative yesterday, Asking for appointment. Able to do virtual if necessary.

## 2024-08-15 ASSESSMENT — ENCOUNTER SYMPTOMS
WHEEZING: 1
ENDOCRINE NEGATIVE: 1
NAUSEA: 1
CONSTIPATION: 0
SHORTNESS OF BREATH: 0
CHEST TIGHTNESS: 1
ALLERGIC/IMMUNOLOGIC NEGATIVE: 1
HEADACHES: 0
CHILLS: 0
SORE THROAT: 0
PSYCHIATRIC NEGATIVE: 1
COUGH: 1
MUSCULOSKELETAL NEGATIVE: 1
UNEXPECTED WEIGHT CHANGE: 0
HEMATOLOGIC/LYMPHATIC NEGATIVE: 1
EYES NEGATIVE: 1
SPEECH DIFFICULTY: 0
ABDOMINAL PAIN: 0
DIZZINESS: 0
VOMITING: 0
FATIGUE: 1
ACTIVITY CHANGE: 0
DIARRHEA: 1
FEVER: 1
PALPITATIONS: 0
NUMBNESS: 0

## 2024-08-17 ENCOUNTER — LAB (OUTPATIENT)
Dept: LAB | Facility: LAB | Age: 39
End: 2024-08-17
Payer: COMMERCIAL

## 2024-08-17 DIAGNOSIS — E23.0 HYPOPITUITARISM DUE TO PITUITARY TUMOR (MULTI): ICD-10-CM

## 2024-08-17 DIAGNOSIS — E23.0 HYPOGONADOTROPIC HYPOGONADISM IN MALE (MULTI): ICD-10-CM

## 2024-08-17 DIAGNOSIS — D49.7 HYPOPITUITARISM DUE TO PITUITARY TUMOR (MULTI): ICD-10-CM

## 2024-08-17 DIAGNOSIS — E23.7: ICD-10-CM

## 2024-08-17 DIAGNOSIS — E27.49 SECONDARY ADRENAL INSUFFICIENCY (MULTI): ICD-10-CM

## 2024-08-17 DIAGNOSIS — E03.8: ICD-10-CM

## 2024-08-17 LAB
ALBUMIN SERPL BCP-MCNC: 4.1 G/DL (ref 3.4–5)
ALP SERPL-CCNC: 42 U/L (ref 33–120)
ALT SERPL W P-5'-P-CCNC: 20 U/L (ref 10–52)
ANION GAP SERPL CALC-SCNC: 10 MMOL/L (ref 10–20)
AST SERPL W P-5'-P-CCNC: 17 U/L (ref 9–39)
BILIRUB SERPL-MCNC: 0.6 MG/DL (ref 0–1.2)
BUN SERPL-MCNC: 19 MG/DL (ref 6–23)
CALCIUM SERPL-MCNC: 8.7 MG/DL (ref 8.6–10.3)
CHLORIDE SERPL-SCNC: 108 MMOL/L (ref 98–107)
CO2 SERPL-SCNC: 29 MMOL/L (ref 21–32)
CREAT SERPL-MCNC: 0.97 MG/DL (ref 0.5–1.3)
EGFRCR SERPLBLD CKD-EPI 2021: >90 ML/MIN/1.73M*2
ERYTHROCYTE [DISTWIDTH] IN BLOOD BY AUTOMATED COUNT: 12.2 % (ref 11.5–14.5)
GLUCOSE SERPL-MCNC: 81 MG/DL (ref 74–99)
HCT VFR BLD AUTO: 40.1 % (ref 41–52)
HGB BLD-MCNC: 13.4 G/DL (ref 13.5–17.5)
MCH RBC QN AUTO: 29.5 PG (ref 26–34)
MCHC RBC AUTO-ENTMCNC: 33.4 G/DL (ref 32–36)
MCV RBC AUTO: 88 FL (ref 80–100)
NRBC BLD-RTO: 0 /100 WBCS (ref 0–0)
PLATELET # BLD AUTO: 272 X10*3/UL (ref 150–450)
POTASSIUM SERPL-SCNC: 3.7 MMOL/L (ref 3.5–5.3)
PROLACTIN SERPL-MCNC: 6 UG/L (ref 2–18)
PROT SERPL-MCNC: 6.1 G/DL (ref 6.4–8.2)
RBC # BLD AUTO: 4.55 X10*6/UL (ref 4.5–5.9)
SODIUM SERPL-SCNC: 143 MMOL/L (ref 136–145)
T4 FREE SERPL-MCNC: 0.88 NG/DL (ref 0.61–1.12)
WBC # BLD AUTO: 7.3 X10*3/UL (ref 4.4–11.3)

## 2024-08-17 PROCEDURE — 36415 COLL VENOUS BLD VENIPUNCTURE: CPT

## 2024-08-17 PROCEDURE — 84270 ASSAY OF SEX HORMONE GLOBUL: CPT

## 2024-08-17 PROCEDURE — 84146 ASSAY OF PROLACTIN: CPT

## 2024-08-17 PROCEDURE — 84305 ASSAY OF SOMATOMEDIN: CPT

## 2024-08-20 LAB
IGF-I SERPL-MCNC: 80 NG/ML (ref 81–236)
IGF-I Z-SCORE SERPL: -2.4

## 2024-08-22 DIAGNOSIS — E23.0 HYPOPITUITARISM (MULTI): ICD-10-CM

## 2024-08-22 DIAGNOSIS — E29.1 HYPOGONADISM MALE: ICD-10-CM

## 2024-08-22 LAB — TESTOSTERONE,TOTAL,LC-MS/MS: 16 NG/DL (ref 250–1100)

## 2024-08-22 RX ORDER — TESTOSTERONE CYPIONATE 200 MG/ML
INJECTION, SOLUTION INTRAMUSCULAR
Qty: 1 ML | Refills: 3 | Status: SHIPPED | OUTPATIENT
Start: 2024-08-22

## 2024-08-22 RX ORDER — SYRINGE WITH NEEDLE, 1 ML 25GX5/8"
SYRINGE, EMPTY DISPOSABLE MISCELLANEOUS
Qty: 2 EACH | Refills: 11 | Status: SHIPPED | OUTPATIENT
Start: 2024-08-22

## 2024-09-30 ENCOUNTER — APPOINTMENT (OUTPATIENT)
Dept: OPHTHALMOLOGY | Facility: CLINIC | Age: 39
End: 2024-09-30
Payer: COMMERCIAL

## 2024-09-30 DIAGNOSIS — G93.89 SUPRASELLAR MASS: Primary | ICD-10-CM

## 2024-09-30 PROCEDURE — 92133 CPTRZD OPH DX IMG PST SGM ON: CPT | Performed by: PSYCHIATRY & NEUROLOGY

## 2024-09-30 PROCEDURE — 92083 EXTENDED VISUAL FIELD XM: CPT | Performed by: PSYCHIATRY & NEUROLOGY

## 2024-09-30 PROCEDURE — 99214 OFFICE O/P EST MOD 30 MIN: CPT | Performed by: PSYCHIATRY & NEUROLOGY

## 2024-09-30 ASSESSMENT — CONF VISUAL FIELD
OD_SUPERIOR_TEMPORAL_RESTRICTION: 0
OS_SUPERIOR_NASAL_RESTRICTION: 0
OD_INFERIOR_TEMPORAL_RESTRICTION: 0
METHOD: COUNTING FINGERS
OS_INFERIOR_NASAL_RESTRICTION: 0
OS_INFERIOR_TEMPORAL_RESTRICTION: 0
OD_NORMAL: 1
OD_SUPERIOR_NASAL_RESTRICTION: 0
OD_INFERIOR_NASAL_RESTRICTION: 0
OS_SUPERIOR_TEMPORAL_RESTRICTION: 0
OS_NORMAL: 1

## 2024-09-30 ASSESSMENT — VISUAL ACUITY
OD_CC: 20/20
METHOD: SNELLEN - LINEAR
OS_CC: 20/20
OD_CC+: -1
CORRECTION_TYPE: CONTACTS

## 2024-09-30 ASSESSMENT — ENCOUNTER SYMPTOMS
HEMATOLOGIC/LYMPHATIC NEGATIVE: 0
ENDOCRINE NEGATIVE: 0
NEUROLOGICAL NEGATIVE: 0
GASTROINTESTINAL NEGATIVE: 0
PSYCHIATRIC NEGATIVE: 0
CARDIOVASCULAR NEGATIVE: 0
MUSCULOSKELETAL NEGATIVE: 0
EYES NEGATIVE: 1
CONSTITUTIONAL NEGATIVE: 0
ALLERGIC/IMMUNOLOGIC NEGATIVE: 0
RESPIRATORY NEGATIVE: 0

## 2024-09-30 ASSESSMENT — TONOMETRY
IOP_METHOD: GOLDMANN APPLANATION
OD_IOP_MMHG: 12
OS_IOP_MMHG: 11

## 2024-09-30 ASSESSMENT — CUP TO DISC RATIO
OD_RATIO: 0.3
OS_RATIO: 0.3

## 2024-09-30 ASSESSMENT — SLIT LAMP EXAM - LIDS
COMMENTS: NORMAL
COMMENTS: NORMAL

## 2024-09-30 ASSESSMENT — EXTERNAL EXAM - RIGHT EYE: OD_EXAM: NORMAL

## 2024-09-30 ASSESSMENT — EXTERNAL EXAM - LEFT EYE: OS_EXAM: NORMAL

## 2024-09-30 NOTE — PROGRESS NOTES
Assessment and Plan    02/12/2024 MRI sella with contrast, which I personally reviewed, shows the cystic sellar mass contacting the optic chiasm judged stable by Radiology.    06/02/2023 MRI sella with contrast, which I personally reviewed previously, shows a cystic sellar mass contacting the optic chiasm judged smaller by Radiology.  12/06/2022 MRI sella with contrast & MRI brain with contrast, which I personally reviewed previously, show a FLAIR hyperintense mass of the pituitary contacting the optic chiasm with minimal enhancement. Per Radiology, they show “This lesion is of unclear etiology, and may represent a cystic macroadenoma, or proteinaceous Rathke`s cleft cyst.”  Prior head imaging.    12/06/2022 lumbar puncture tube 1: RBC 2, WBC 8 (100% M), tube 4: RBC 1 & WBC 9 (100% M), protein 63 & glucose 57. HSV PCR negative.    12/07/2022 cortisol LOW. ACTH LOW <1..5. TSH wnl. fT4 low.    09/30/2024 OCT RNFL OD 89 & OS 89. (Similar)  09/18/2023 OCT RNFL OD 85 & OS 87. (minimal decrase)  02/28/2023 OCT RNFL OD 91 & OS 91.    09/30/2024 HVF 24-2 OD fovea 39, wnl MD +1.14 & OS fovea 38, wnl MD +0.88.  09/18/2023 HVF 24-2 OD fovea 39, wnl MD +1.72 & OS fovea 42, wnl MD +0.79.  02/28/2023 HVF 24-2 OD fovea 34, wnl MD +0.95 & OS fovea 33, wnl MD +1.94.    This 39 year-old man with a history of suprasellar mass with hypopituitarism, obesity presents in follow up for evaluation of visual fields.    As before, he has good vision and testing results of vision while the mass remains radiographically concerning. I recommend continued surveillance.    Plan    Surveillance.  Endocrinologist care    Follow up in 1 year  with HVF & OCT. (dilated 9/30/2024)

## 2024-10-25 ENCOUNTER — TELEMEDICINE (OUTPATIENT)
Dept: ENDOCRINOLOGY | Facility: CLINIC | Age: 39
End: 2024-10-25
Payer: COMMERCIAL

## 2024-10-25 DIAGNOSIS — E27.49 SECONDARY ADRENAL INSUFFICIENCY (MULTI): ICD-10-CM

## 2024-10-25 DIAGNOSIS — E23.0 HYPOGONADOTROPIC HYPOGONADISM IN MALE (MULTI): ICD-10-CM

## 2024-10-25 DIAGNOSIS — E23.7: ICD-10-CM

## 2024-10-25 DIAGNOSIS — Z51.81 ENCOUNTER FOR MONITORING TESTOSTERONE REPLACEMENT THERAPY: ICD-10-CM

## 2024-10-25 DIAGNOSIS — E23.0 HYPOPITUITARISM DUE TO PITUITARY TUMOR (MULTI): Primary | ICD-10-CM

## 2024-10-25 DIAGNOSIS — D49.7 HYPOPITUITARISM DUE TO PITUITARY TUMOR (MULTI): Primary | ICD-10-CM

## 2024-10-25 DIAGNOSIS — Z79.890 ENCOUNTER FOR MONITORING TESTOSTERONE REPLACEMENT THERAPY: ICD-10-CM

## 2024-10-25 DIAGNOSIS — E03.8: ICD-10-CM

## 2024-10-25 PROCEDURE — 99214 OFFICE O/P EST MOD 30 MIN: CPT | Performed by: INTERNAL MEDICINE

## 2024-10-25 RX ORDER — PREDNISONE 5 MG/1
5 TABLET ORAL DAILY
Qty: 90 TABLET | Refills: 3 | Status: SHIPPED | OUTPATIENT
Start: 2024-10-25 | End: 2025-10-25

## 2024-10-25 NOTE — PROGRESS NOTES
An interactive audio and video telecommunication system which permits real time communications between the patient (at the originating site) and provider (at the distant site) was utilized to provide this telehealth service.   Verbal consent was requested and obtained from Adolph Delgado on this date, 10/25/24 for a telehealth visit.     Subjective   Adolph Delgado is a 39 y.o. male who presents to endocrinology clinic for follow-up of No chief complaint on file.  Last visit: 8/2/24 (virtual)     ENDOCRINE Hx:   Went to the ED 12/5/22 for altered mentation, dx with influenza meningitis. MRI sella w/wo contrast 12/7/22: 1.6x1.6x2cm non-enhancing slightly heterogenous sellar mass abutting optic chiasm and the cavernous sinuses bilaterally. Labs sent 12/7/22 (during hospitalization): low FT4 with inappropriately normal TSH, undetectable ACTH, and low free cortisol. In late Feb 2023, saw neuro-ophtho - no significant visual field defects.  Initial endocrine eval in early March. 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 He was started on LT4 and HC replacement, followed by T replacement. Referred to sleep med for c/f GENTRY; saw in May 2023, recommended PSG.  MRI sella repeated in June 2023: stable to slight decrease in size  MRI sella repeated Feb 2024: stable     INTERVAL Hx:   Feeling sick lately, waking up nauseated and staying nauseated.    Sometimes having abdominal pain.  Energy is only okay, feels tired in the afternoon especially.  Working a 12 hour shift every day  Had a pneumonia a month ago - did stress dose.      Hasn't gotten PSG yet  Last saw ophtho in Sept, vision testing was reassuring    CURRENT REGIMEN:  Testosterone 100mg IM q2 weeks on Sundays - now administering himself, no issues  Levothyroxine 150 mcg - taking daily in the AM  Hydrocortisone - taking 2 tabs in morning,  1 tab midday, 1 tab around 5pm   Reports lately skips afternoon dose, since shift got longer / busier.      ROS: otherwise negative.    Objective   Physical Exam  alert and conversant, in no acute distress  no lid lag, no proptosis  no visible goiter  normal work of breathing  + facial hair  No visible rashes  Normal mood /affect     Lab Results   Component Value Date    TSH 2.79 03/04/2023    FREET4 0.88 08/17/2024    PROLACTIN 6.0 08/17/2024    [id number] 80 (L) 08/17/2024    TRIG 336 (H) 12/07/2022    ALT 20 08/17/2024    AST 17 08/17/2024    TESTOTOTMS 16 (L) 08/17/2024   Last labs were taken off T    Assessment/Plan   39 y.o. 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  Having symptoms lately of AI, suspect due to often missing afternoon and evening doses of HC     PLAN  - switch HC to predniSONE 5 mg tablet; Take 1 tablet (5 mg) by mouth once daily.  - continue T 100mg q2 weeks (next dose this weekend)  - continue LT4 150mcg daily  - labs next week  -     Thyroxine, Free; Future  -     Prolactin; Future  -     Comprehensive Metabolic Panel; Future  -     Testosterone, free, total; Future  -     CBC; Future  - recommended scheduling his PSG  - plan to repeat MRI early 2025 (ordered by NSGY)  - Keep in person FUV in Feb 2025

## 2024-11-02 ENCOUNTER — LAB (OUTPATIENT)
Dept: LAB | Facility: LAB | Age: 39
End: 2024-11-02
Payer: COMMERCIAL

## 2024-11-02 DIAGNOSIS — E23.0 HYPOGONADOTROPIC HYPOGONADISM IN MALE (MULTI): ICD-10-CM

## 2024-11-02 DIAGNOSIS — E27.49 SECONDARY ADRENAL INSUFFICIENCY (MULTI): ICD-10-CM

## 2024-11-02 DIAGNOSIS — E03.8: ICD-10-CM

## 2024-11-02 DIAGNOSIS — D49.7 HYPOPITUITARISM DUE TO PITUITARY TUMOR (MULTI): ICD-10-CM

## 2024-11-02 DIAGNOSIS — E23.7: ICD-10-CM

## 2024-11-02 DIAGNOSIS — Z79.890 ENCOUNTER FOR MONITORING TESTOSTERONE REPLACEMENT THERAPY: ICD-10-CM

## 2024-11-02 DIAGNOSIS — Z51.81 ENCOUNTER FOR MONITORING TESTOSTERONE REPLACEMENT THERAPY: ICD-10-CM

## 2024-11-02 DIAGNOSIS — E23.0 HYPOPITUITARISM DUE TO PITUITARY TUMOR (MULTI): ICD-10-CM

## 2024-11-02 LAB
ALBUMIN SERPL BCP-MCNC: 4.5 G/DL (ref 3.4–5)
ALP SERPL-CCNC: 51 U/L (ref 33–120)
ALT SERPL W P-5'-P-CCNC: 16 U/L (ref 10–52)
ANION GAP SERPL CALC-SCNC: 10 MMOL/L (ref 10–20)
AST SERPL W P-5'-P-CCNC: 16 U/L (ref 9–39)
BILIRUB SERPL-MCNC: 0.7 MG/DL (ref 0–1.2)
BUN SERPL-MCNC: 16 MG/DL (ref 6–23)
CALCIUM SERPL-MCNC: 9.3 MG/DL (ref 8.6–10.3)
CHLORIDE SERPL-SCNC: 108 MMOL/L (ref 98–107)
CO2 SERPL-SCNC: 28 MMOL/L (ref 21–32)
CREAT SERPL-MCNC: 0.77 MG/DL (ref 0.5–1.3)
EGFRCR SERPLBLD CKD-EPI 2021: >90 ML/MIN/1.73M*2
ERYTHROCYTE [DISTWIDTH] IN BLOOD BY AUTOMATED COUNT: 13.1 % (ref 11.5–14.5)
GLUCOSE SERPL-MCNC: 128 MG/DL (ref 74–99)
HCT VFR BLD AUTO: 39.4 % (ref 41–52)
HGB BLD-MCNC: 13.7 G/DL (ref 13.5–17.5)
MCH RBC QN AUTO: 30.3 PG (ref 26–34)
MCHC RBC AUTO-ENTMCNC: 34.8 G/DL (ref 32–36)
MCV RBC AUTO: 87 FL (ref 80–100)
NRBC BLD-RTO: 0 /100 WBCS (ref 0–0)
PLATELET # BLD AUTO: 210 X10*3/UL (ref 150–450)
POTASSIUM SERPL-SCNC: 4.1 MMOL/L (ref 3.5–5.3)
PROLACTIN SERPL-MCNC: 10 UG/L (ref 2–18)
PROT SERPL-MCNC: 6.3 G/DL (ref 6.4–8.2)
RBC # BLD AUTO: 4.52 X10*6/UL (ref 4.5–5.9)
SODIUM SERPL-SCNC: 142 MMOL/L (ref 136–145)
T4 FREE SERPL-MCNC: 1.05 NG/DL (ref 0.61–1.12)
WBC # BLD AUTO: 5 X10*3/UL (ref 4.4–11.3)

## 2024-11-02 PROCEDURE — 84402 ASSAY OF FREE TESTOSTERONE: CPT

## 2024-11-02 PROCEDURE — 84146 ASSAY OF PROLACTIN: CPT

## 2024-11-02 PROCEDURE — 36415 COLL VENOUS BLD VENIPUNCTURE: CPT

## 2024-11-07 LAB
TESTOSTERONE FREE (CHAN): 82.1 PG/ML (ref 35–155)
TESTOSTERONE,TOTAL,LC-MS/MS: 439 NG/DL (ref 250–1100)

## 2025-01-07 ENCOUNTER — PATIENT MESSAGE (OUTPATIENT)
Dept: ENDOCRINOLOGY | Facility: CLINIC | Age: 40
End: 2025-01-07
Payer: COMMERCIAL

## 2025-01-07 DIAGNOSIS — E29.1 HYPOGONADISM MALE: ICD-10-CM

## 2025-01-07 DIAGNOSIS — E23.0 HYPOPITUITARISM (MULTI): ICD-10-CM

## 2025-01-08 RX ORDER — TESTOSTERONE CYPIONATE 200 MG/ML
INJECTION, SOLUTION INTRAMUSCULAR
Qty: 1 ML | Refills: 0 | Status: SHIPPED | OUTPATIENT
Start: 2025-01-08

## 2025-02-03 ENCOUNTER — APPOINTMENT (OUTPATIENT)
Dept: ENDOCRINOLOGY | Facility: CLINIC | Age: 40
End: 2025-02-03
Payer: COMMERCIAL

## 2025-02-10 ENCOUNTER — HOSPITAL ENCOUNTER (OUTPATIENT)
Dept: RADIOLOGY | Facility: HOSPITAL | Age: 40
Discharge: HOME | End: 2025-02-10
Payer: COMMERCIAL

## 2025-02-10 ENCOUNTER — APPOINTMENT (OUTPATIENT)
Dept: ENDOCRINOLOGY | Facility: CLINIC | Age: 40
End: 2025-02-10
Payer: COMMERCIAL

## 2025-02-10 VITALS
DIASTOLIC BLOOD PRESSURE: 70 MMHG | SYSTOLIC BLOOD PRESSURE: 108 MMHG | WEIGHT: 246 LBS | BODY MASS INDEX: 36.43 KG/M2 | TEMPERATURE: 96.8 F | HEART RATE: 75 BPM | HEIGHT: 69 IN

## 2025-02-10 DIAGNOSIS — E23.0 HYPOGONADOTROPIC HYPOGONADISM IN MALE (MULTI): ICD-10-CM

## 2025-02-10 DIAGNOSIS — E03.8: ICD-10-CM

## 2025-02-10 DIAGNOSIS — E23.0 HYPOPITUITARISM DUE TO PITUITARY TUMOR (MULTI): ICD-10-CM

## 2025-02-10 DIAGNOSIS — D49.7 HYPOPITUITARISM DUE TO PITUITARY TUMOR (MULTI): ICD-10-CM

## 2025-02-10 DIAGNOSIS — E23.6 RATHKE'S CLEFT CYST (MULTI): ICD-10-CM

## 2025-02-10 DIAGNOSIS — Z51.81 ENCOUNTER FOR MONITORING TESTOSTERONE REPLACEMENT THERAPY: Primary | ICD-10-CM

## 2025-02-10 DIAGNOSIS — E23.7: ICD-10-CM

## 2025-02-10 DIAGNOSIS — Z79.890 ENCOUNTER FOR MONITORING TESTOSTERONE REPLACEMENT THERAPY: Primary | ICD-10-CM

## 2025-02-10 PROCEDURE — 2550000001 HC RX 255 CONTRASTS: Performed by: NURSE PRACTITIONER

## 2025-02-10 PROCEDURE — 99214 OFFICE O/P EST MOD 30 MIN: CPT | Performed by: INTERNAL MEDICINE

## 2025-02-10 PROCEDURE — A9575 INJ GADOTERATE MEGLUMI 0.1ML: HCPCS | Performed by: NURSE PRACTITIONER

## 2025-02-10 PROCEDURE — 3008F BODY MASS INDEX DOCD: CPT | Performed by: INTERNAL MEDICINE

## 2025-02-10 PROCEDURE — 70553 MRI BRAIN STEM W/O & W/DYE: CPT

## 2025-02-10 PROCEDURE — 70553 MRI BRAIN STEM W/O & W/DYE: CPT | Performed by: RADIOLOGY

## 2025-02-10 RX ORDER — GADOTERATE MEGLUMINE 376.9 MG/ML
20 INJECTION INTRAVENOUS
Status: COMPLETED | OUTPATIENT
Start: 2025-02-10 | End: 2025-02-10

## 2025-02-10 RX ORDER — PREDNISONE 1 MG/1
4 TABLET ORAL DAILY
Qty: 360 TABLET | Refills: 3 | Status: SHIPPED | OUTPATIENT
Start: 2025-02-10 | End: 2026-02-10

## 2025-02-10 RX ORDER — LEVOTHYROXINE SODIUM 150 UG/1
150 TABLET ORAL
Qty: 90 TABLET | Refills: 3 | Status: SHIPPED | OUTPATIENT
Start: 2025-02-10 | End: 2026-02-10

## 2025-02-10 RX ORDER — SYRINGE WITH NEEDLE, 1 ML 25GX5/8"
SYRINGE, EMPTY DISPOSABLE MISCELLANEOUS
Qty: 6 EACH | Refills: 3 | Status: SHIPPED | OUTPATIENT
Start: 2025-02-10

## 2025-02-10 RX ORDER — TESTOSTERONE CYPIONATE 200 MG/ML
INJECTION, SOLUTION INTRAMUSCULAR
Qty: 1 ML | Refills: 5 | Status: SHIPPED | OUTPATIENT
Start: 2025-02-10

## 2025-02-10 RX ADMIN — GADOTERATE MEGLUMINE 20 ML: 376.9 INJECTION INTRAVENOUS at 08:47

## 2025-02-10 ASSESSMENT — ENCOUNTER SYMPTOMS
DEPRESSION: 0
LOSS OF SENSATION IN FEET: 0
OCCASIONAL FEELINGS OF UNSTEADINESS: 0

## 2025-02-10 ASSESSMENT — PATIENT HEALTH QUESTIONNAIRE - PHQ9
1. LITTLE INTEREST OR PLEASURE IN DOING THINGS: NOT AT ALL
2. FEELING DOWN, DEPRESSED OR HOPELESS: NOT AT ALL
SUM OF ALL RESPONSES TO PHQ9 QUESTIONS 1 AND 2: 0

## 2025-02-10 NOTE — PROGRESS NOTES
"Subjective   Adolph Delgado is a 40 y.o. male who presents to endocrinology clinic for follow-up   Last visit: 10/25/24 (virtual)    ENDOCRINE Hx:   Went to the ED 12/5/22 for altered mentation, dx with influenza meningitis. MRI sella w/wo contrast 12/7/22: 1.6x1.6x2cm non-enhancing slightly heterogenous sellar mass abutting optic chiasm and the cavernous sinuses bilaterally. Labs sent 12/7/22 (during hospitalization): low FT4 with inappropriately normal TSH, undetectable ACTH, and low free cortisol. In late Feb 2023, saw neuro-ophtho - no significant visual field defects.  Initial endocrine eval in early March. 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 He was started on LT4 and HC replacement, followed by T replacement. Referred to sleep med for c/f GENTRY; saw in May 2023, recommended PSG.  MRI sella repeated in June 2023: stable to slight decrease in size  MRI sella repeated Feb 2024: stable   Switched HC to prednisone Oct 2024 as was often missing afternoon HC doses.    INTERVAL Hx:   Reports good energy and appetite. Sleeping well.  No heat/cold intolerance, diarrhea, constipation, skin/hair changes, myalgias. Has more body hair.    Gaining weight - 30 lb since last summer.  Quit eating out and no longer drinking etoh.   Physical activity is still the same, works outside every day.     Had had a few viral illnesses, flu-like symptoms around Thanksgiving, took a stress dose    CURRENT REGIMEN:  Testosterone 100mg IM q2 weeks on Sundays - now administering himself, no issues  Levothyroxine 150 mcg - taking daily in the AM on empty stomach  Prednisone 5mg - taking daily in the AM    ROS: otherwise negative.    Objective   /70 (BP Location: Right arm, Patient Position: Sitting, BP Cuff Size: Adult)   Pulse 75   Temp 36 °C (96.8 °F) (Temporal)   Ht 1.753 m (5' 9\")   Wt 112 " Marv's asking for his lab results from today 11/19 to be faxed to him @ [phone].     Phone# [phone]       kg (246 lb)   BMI 36.33 kg/m²     Physical Exam  alert and conversant, in no acute distress  no lid lag, no proptosis  no visible goiter  normal work of breathing  + facial hair  No visible rashes  Normal mood /affect     Lab Results   Component Value Date    TSH 2.79 03/04/2023    FREET4 1.05 11/02/2024    PROLACTIN 10.0 11/02/2024    [id number] 80 (L) 08/17/2024    TRIG 336 (H) 12/07/2022    ALT 16 11/02/2024    AST 16 11/02/2024    TESTOTOTMS 439 11/02/2024     MRI sella w/wo 2/10/25:  Seen again within the sella turcica is a T1 isoenhancing, T2 hyperenhancing mass measuring 1.2 x 1.3 x 1.8 cm (transverse by craniocaudal by anterior-posterior), which previously measured the same on prior MRI dated 02/12/2024.  Again seen is abutment of the suprasellar component of the mass with the optic chiasm. Intracranial segments of the optic nerves can be seen draping over the superolateral margin of the lesion. Seen laterally is abutment of the bilateral carotid siphons, which is unchanged. No abnormal narrowing of the carotid siphons bilaterally. The bilateral cavernous sinuses remain within normal limits.      Assessment/Plan   40 y.o. 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    Currently well replaced with levothyroxine and testosterone replacement therapy however is having weight gain which could be related to slight over replacement now with prednisone (switch made in Oct 2024 as was having adherence issues with HC causing clinical AI sx), +/- untreated GH deficiency.    PLAN  Labs in April:  -     Testosterone, free, total; Future  -     CBC; Future  -     Prostate Specific Antigen, Screen; Future  -     Insulin-Like Growth Factor 1; Future  Continue testosterone & levothyroxine  -      "testosterone cypionate (Depo-Testosterone) 200 mg/mL injection; INJECT 100MG (0.5ML) EVERY 2 WEEKS INTRAMUSCULARLY  -     BD Luer-Karel Syringe 3 mL 23 x 1\" syringe; Use to inject testosterone every 2 weeks  -     levothyroxine (Synthroid, Levoxyl) 150 mcg tablet; Take 1 tablet (150 mcg) by mouth once daily in the morning.  Lower dose of prednisone slightly:  -     predniSONE (Deltasone) 1 mg tablet; Take 4 tablets (4 mg) by mouth once daily.  FUV 6 mo     "

## 2025-02-28 ENCOUNTER — DOCUMENTATION (OUTPATIENT)
Dept: NEUROSURGERY | Facility: HOSPITAL | Age: 40
End: 2025-02-28
Payer: COMMERCIAL

## 2025-02-28 NOTE — PROGRESS NOTES
[facility]  Neurosurgery    MRI Sella w/wo 02/10/2025:  Continued stable T1 isoenhancing, T2 hyperenhancing sellar mass measuring 1.2 x 1.3 x 1.8cm with abutment of the OC.         Imaging Dates: 02/10/25, 02/12/24, 06/02/23     Patient is following with neuro-opthalmology with continued good visual field testing without deficit. Given ongoing stability of scan and no visual field deficits will plan for continued observation with repeat MRI Sella w/wo in 1 year. Updated patient via phone.

## 2025-04-01 DIAGNOSIS — Z79.890 ENCOUNTER FOR MONITORING TESTOSTERONE REPLACEMENT THERAPY: ICD-10-CM

## 2025-04-01 DIAGNOSIS — D49.7 HYPOPITUITARISM DUE TO PITUITARY TUMOR (MULTI): ICD-10-CM

## 2025-04-01 DIAGNOSIS — Z51.81 ENCOUNTER FOR MONITORING TESTOSTERONE REPLACEMENT THERAPY: ICD-10-CM

## 2025-04-01 DIAGNOSIS — E23.0 HYPOGONADOTROPIC HYPOGONADISM IN MALE (MULTI): ICD-10-CM

## 2025-04-01 DIAGNOSIS — E23.0 HYPOPITUITARISM DUE TO PITUITARY TUMOR (MULTI): ICD-10-CM

## 2025-07-25 DIAGNOSIS — E23.0 HYPOPITUITARISM DUE TO PITUITARY TUMOR (MULTI): ICD-10-CM

## 2025-07-25 DIAGNOSIS — D49.7 HYPOPITUITARISM DUE TO PITUITARY TUMOR (MULTI): ICD-10-CM

## 2025-07-25 RX ORDER — TESTOSTERONE CYPIONATE 200 MG/ML
INJECTION, SOLUTION INTRAMUSCULAR
Qty: 1 ML | Refills: 0 | Status: SHIPPED | OUTPATIENT
Start: 2025-07-25

## 2025-08-18 ENCOUNTER — APPOINTMENT (OUTPATIENT)
Dept: ENDOCRINOLOGY | Facility: CLINIC | Age: 40
End: 2025-08-18
Payer: COMMERCIAL

## 2025-08-22 LAB
ALBUMIN SERPL-MCNC: 4.7 G/DL (ref 3.6–5.1)
ALP SERPL-CCNC: 47 U/L (ref 36–130)
ALT SERPL-CCNC: 17 U/L (ref 9–46)
ANION GAP SERPL CALCULATED.4IONS-SCNC: 8 MMOL/L (CALC) (ref 7–17)
AST SERPL-CCNC: 18 U/L (ref 10–40)
BILIRUB SERPL-MCNC: 0.6 MG/DL (ref 0.2–1.2)
BUN SERPL-MCNC: 19 MG/DL (ref 7–25)
CALCIUM SERPL-MCNC: 9.1 MG/DL (ref 8.6–10.3)
CHLORIDE SERPL-SCNC: 105 MMOL/L (ref 98–110)
CO2 SERPL-SCNC: 29 MMOL/L (ref 20–32)
CREAT SERPL-MCNC: 0.98 MG/DL (ref 0.6–1.29)
EGFRCR SERPLBLD CKD-EPI 2021: 99 ML/MIN/1.73M2
ERYTHROCYTE [DISTWIDTH] IN BLOOD BY AUTOMATED COUNT: 14 % (ref 11–15)
GLUCOSE SERPL-MCNC: 90 MG/DL (ref 65–99)
HCT VFR BLD AUTO: 42.1 % (ref 38.5–50)
HGB BLD-MCNC: 14.4 G/DL (ref 13.2–17.1)
IGF-I SERPL-MCNC: NORMAL NG/ML
IGF-I Z-SCORE SERPL: NORMAL
IGF-I Z-SCORE SERPL: NORMAL
MCH RBC QN AUTO: 31 PG (ref 27–33)
MCHC RBC AUTO-ENTMCNC: 34.2 G/DL (ref 32–36)
MCV RBC AUTO: 90.7 FL (ref 80–100)
PLATELET # BLD AUTO: 217 THOUSAND/UL (ref 140–400)
PMV BLD REES-ECKER: 9.8 FL (ref 7.5–12.5)
POTASSIUM SERPL-SCNC: 4.1 MMOL/L (ref 3.5–5.3)
PROT SERPL-MCNC: 6.6 G/DL (ref 6.1–8.1)
PSA SERPL-MCNC: 0.34 NG/ML
RBC # BLD AUTO: 4.64 MILLION/UL (ref 4.2–5.8)
SODIUM SERPL-SCNC: 142 MMOL/L (ref 135–146)
T4 FREE SERPL-MCNC: 1 NG/DL (ref 0.8–1.8)
TESTOST FREE SERPL-MCNC: NORMAL PG/ML
TESTOST SERPL-MCNC: NORMAL NG/DL
WBC # BLD AUTO: 4.5 THOUSAND/UL (ref 3.8–10.8)

## 2025-08-28 LAB
ALBUMIN SERPL-MCNC: 4.7 G/DL (ref 3.6–5.1)
ALP SERPL-CCNC: 47 U/L (ref 36–130)
ALT SERPL-CCNC: 17 U/L (ref 9–46)
ANION GAP SERPL CALCULATED.4IONS-SCNC: 8 MMOL/L (CALC) (ref 7–17)
AST SERPL-CCNC: 18 U/L (ref 10–40)
BILIRUB SERPL-MCNC: 0.6 MG/DL (ref 0.2–1.2)
BUN SERPL-MCNC: 19 MG/DL (ref 7–25)
CALCIUM SERPL-MCNC: 9.1 MG/DL (ref 8.6–10.3)
CHLORIDE SERPL-SCNC: 105 MMOL/L (ref 98–110)
CO2 SERPL-SCNC: 29 MMOL/L (ref 20–32)
CREAT SERPL-MCNC: 0.98 MG/DL (ref 0.6–1.29)
EGFRCR SERPLBLD CKD-EPI 2021: 99 ML/MIN/1.73M2
ERYTHROCYTE [DISTWIDTH] IN BLOOD BY AUTOMATED COUNT: 14 % (ref 11–15)
GLUCOSE SERPL-MCNC: 90 MG/DL (ref 65–99)
HCT VFR BLD AUTO: 42.1 % (ref 38.5–50)
HGB BLD-MCNC: 14.4 G/DL (ref 13.2–17.1)
IGF-I SERPL-MCNC: 99 NG/ML (ref 52–328)
IGF-I Z-SCORE SERPL: -0.8 SD
MCH RBC QN AUTO: 31 PG (ref 27–33)
MCHC RBC AUTO-ENTMCNC: 34.2 G/DL (ref 32–36)
MCV RBC AUTO: 90.7 FL (ref 80–100)
PLATELET # BLD AUTO: 217 THOUSAND/UL (ref 140–400)
PMV BLD REES-ECKER: 9.8 FL (ref 7.5–12.5)
POTASSIUM SERPL-SCNC: 4.1 MMOL/L (ref 3.5–5.3)
PROT SERPL-MCNC: 6.6 G/DL (ref 6.1–8.1)
PSA SERPL-MCNC: 0.34 NG/ML
RBC # BLD AUTO: 4.64 MILLION/UL (ref 4.2–5.8)
SODIUM SERPL-SCNC: 142 MMOL/L (ref 135–146)
T4 FREE SERPL-MCNC: 1 NG/DL (ref 0.8–1.8)
TESTOST FREE SERPL-MCNC: 14.7 PG/ML (ref 35–155)
TESTOST SERPL-MCNC: 75 NG/DL (ref 250–1100)
WBC # BLD AUTO: 4.5 THOUSAND/UL (ref 3.8–10.8)

## 2025-08-29 DIAGNOSIS — D49.7 HYPOPITUITARISM DUE TO PITUITARY TUMOR (MULTI): ICD-10-CM

## 2025-08-29 DIAGNOSIS — E23.0 HYPOPITUITARISM DUE TO PITUITARY TUMOR (MULTI): ICD-10-CM

## 2025-08-29 RX ORDER — TESTOSTERONE CYPIONATE 200 MG/ML
INJECTION, SOLUTION INTRAMUSCULAR
Qty: 1 ML | Refills: 0 | Status: SHIPPED | OUTPATIENT
Start: 2025-08-29

## 2025-10-01 ENCOUNTER — APPOINTMENT (OUTPATIENT)
Dept: OPHTHALMOLOGY | Facility: CLINIC | Age: 40
End: 2025-10-01
Payer: COMMERCIAL

## 2025-10-31 ENCOUNTER — APPOINTMENT (OUTPATIENT)
Dept: OPHTHALMOLOGY | Facility: CLINIC | Age: 40
End: 2025-10-31
Payer: COMMERCIAL

## 2025-11-05 ENCOUNTER — APPOINTMENT (OUTPATIENT)
Dept: OPHTHALMOLOGY | Facility: CLINIC | Age: 40
End: 2025-11-05
Payer: COMMERCIAL

## 2025-11-25 ENCOUNTER — APPOINTMENT (OUTPATIENT)
Dept: ENDOCRINOLOGY | Facility: CLINIC | Age: 40
End: 2025-11-25
Payer: COMMERCIAL

## 2026-03-16 ENCOUNTER — APPOINTMENT (OUTPATIENT)
Dept: ENDOCRINOLOGY | Facility: CLINIC | Age: 41
End: 2026-03-16
Payer: COMMERCIAL